# Patient Record
Sex: MALE | Race: BLACK OR AFRICAN AMERICAN | URBAN - METROPOLITAN AREA
[De-identification: names, ages, dates, MRNs, and addresses within clinical notes are randomized per-mention and may not be internally consistent; named-entity substitution may affect disease eponyms.]

---

## 2020-09-14 ENCOUNTER — LAB OUTSIDE AN ENCOUNTER (OUTPATIENT)
Dept: URBAN - METROPOLITAN AREA CLINIC 25 | Facility: CLINIC | Age: 40
End: 2020-09-14

## 2020-09-14 ENCOUNTER — OFFICE VISIT (OUTPATIENT)
Dept: URBAN - METROPOLITAN AREA CLINIC 25 | Facility: CLINIC | Age: 40
End: 2020-09-14
Payer: COMMERCIAL

## 2020-09-14 DIAGNOSIS — K92.1 RECTAL BLEEDING: ICD-10-CM

## 2020-09-14 PROCEDURE — G8417 CALC BMI ABV UP PARAM F/U: HCPCS | Performed by: INTERNAL MEDICINE

## 2020-09-14 PROCEDURE — G9903 PT SCRN TBCO ID AS NON USER: HCPCS | Performed by: INTERNAL MEDICINE

## 2020-09-14 PROCEDURE — 99204 OFFICE O/P NEW MOD 45 MIN: CPT | Performed by: INTERNAL MEDICINE

## 2020-09-14 PROCEDURE — G8427 DOCREV CUR MEDS BY ELIG CLIN: HCPCS | Performed by: INTERNAL MEDICINE

## 2020-09-14 NOTE — HPI-TODAY'S VISIT:
Sept 2020:  intermittent rectal bleeding ongoing for over 2 yrs. no prior colonoscopy. No family history of colon cancer / GI malignancies / IBD. sometimes stools are looser. no abdominal pain.

## 2020-09-29 ENCOUNTER — OFFICE VISIT (OUTPATIENT)
Dept: URBAN - METROPOLITAN AREA SURGERY CENTER 20 | Facility: SURGERY CENTER | Age: 40
End: 2020-09-29
Payer: COMMERCIAL

## 2020-09-29 DIAGNOSIS — K63.89 BACTERIAL OVERGROWTH SYNDROME: ICD-10-CM

## 2020-09-29 DIAGNOSIS — K92.1 BLACK STOOL: ICD-10-CM

## 2020-09-29 DIAGNOSIS — K52.89 (LYMPHOCYTIC) MICROSCOPIC COLITIS: ICD-10-CM

## 2020-09-29 DIAGNOSIS — K63.3 APHTHOUS ULCER OF COLON: ICD-10-CM

## 2020-09-29 PROCEDURE — 45380 COLONOSCOPY AND BIOPSY: CPT | Performed by: INTERNAL MEDICINE

## 2020-09-29 PROCEDURE — G8907 PT DOC NO EVENTS ON DISCHARG: HCPCS | Performed by: INTERNAL MEDICINE

## 2020-09-29 PROCEDURE — G9937 DIG OR SURV COLSCO: HCPCS | Performed by: INTERNAL MEDICINE

## 2020-10-02 ENCOUNTER — TELEPHONE ENCOUNTER (OUTPATIENT)
Dept: URBAN - METROPOLITAN AREA CLINIC 92 | Facility: CLINIC | Age: 40
End: 2020-10-02

## 2020-10-02 ENCOUNTER — LAB OUTSIDE AN ENCOUNTER (OUTPATIENT)
Dept: URBAN - METROPOLITAN AREA CLINIC 92 | Facility: CLINIC | Age: 40
End: 2020-10-02

## 2020-10-22 ENCOUNTER — LAB OUTSIDE AN ENCOUNTER (OUTPATIENT)
Dept: URBAN - METROPOLITAN AREA CLINIC 25 | Facility: CLINIC | Age: 40
End: 2020-10-22

## 2020-10-28 LAB
BASO (ABSOLUTE): 0.1
BASOS: 1
C DIFFICILE TOXIN GENE NAA: NEGATIVE
C-REACTIVE PROTEIN, QUANT: 4
CALPROTECTIN, FECAL: 179
EOS (ABSOLUTE): 0.4
EOS: 7
HBSAG SCREEN: NEGATIVE
HEMATOCRIT: 36.7
HEMATOLOGY COMMENTS:: (no result)
HEMOGLOBIN: 11.8
HEP B SURFACE AB, QUAL: NON REACTIVE
IMMATURE CELLS: (no result)
IMMATURE GRANS (ABS): 0
IMMATURE GRANULOCYTES: 0
LYMPHS (ABSOLUTE): 1.4
LYMPHS: 26
MCH: 25.1
MCHC: 32.2
MCV: 78
MONOCYTES(ABSOLUTE): 0.5
MONOCYTES: 9
NEUTROPHILS (ABSOLUTE): 3.1
NEUTROPHILS: 57
NRBC: (no result)
PLATELETS: 385
QUANTIFERON CRITERIA: (no result)
QUANTIFERON INCUBATION: (no result)
QUANTIFERON MITOGEN VALUE: (no result)
QUANTIFERON NIL VALUE: (no result)
QUANTIFERON TB1 AG VALUE: (no result)
QUANTIFERON TB2 AG VALUE: (no result)
QUANTIFERON-TB GOLD PLUS: (no result)
RBC: 4.71
RDW: 14.5
REQUEST PROBLEM: (no result)
WBC: 5.4

## 2020-12-03 ENCOUNTER — OFFICE VISIT (OUTPATIENT)
Dept: URBAN - METROPOLITAN AREA CLINIC 84 | Facility: CLINIC | Age: 40
End: 2020-12-03
Payer: COMMERCIAL

## 2020-12-03 ENCOUNTER — WEB ENCOUNTER (OUTPATIENT)
Dept: URBAN - METROPOLITAN AREA CLINIC 84 | Facility: CLINIC | Age: 40
End: 2020-12-03

## 2020-12-03 ENCOUNTER — LAB OUTSIDE AN ENCOUNTER (OUTPATIENT)
Dept: URBAN - METROPOLITAN AREA CLINIC 84 | Facility: CLINIC | Age: 40
End: 2020-12-03

## 2020-12-03 ENCOUNTER — OFFICE VISIT (OUTPATIENT)
Dept: URBAN - METROPOLITAN AREA CLINIC 84 | Facility: CLINIC | Age: 40
End: 2020-12-03

## 2020-12-03 ENCOUNTER — TELEPHONE ENCOUNTER (OUTPATIENT)
Dept: URBAN - METROPOLITAN AREA CLINIC 92 | Facility: CLINIC | Age: 40
End: 2020-12-03

## 2020-12-03 DIAGNOSIS — K52.9 COLITIS: ICD-10-CM

## 2020-12-03 DIAGNOSIS — K42.9 UMBILICAL HERNIA: ICD-10-CM

## 2020-12-03 DIAGNOSIS — K92.1 RECTAL BLEEDING: ICD-10-CM

## 2020-12-03 PROCEDURE — G9903 PT SCRN TBCO ID AS NON USER: HCPCS | Performed by: INTERNAL MEDICINE

## 2020-12-03 PROCEDURE — G8417 CALC BMI ABV UP PARAM F/U: HCPCS | Performed by: INTERNAL MEDICINE

## 2020-12-03 PROCEDURE — G8427 DOCREV CUR MEDS BY ELIG CLIN: HCPCS | Performed by: INTERNAL MEDICINE

## 2020-12-03 PROCEDURE — G8483 FLU IMM NO ADMIN DOC REA: HCPCS | Performed by: INTERNAL MEDICINE

## 2020-12-03 PROCEDURE — 99214 OFFICE O/P EST MOD 30 MIN: CPT | Performed by: INTERNAL MEDICINE

## 2020-12-03 NOTE — HPI-TODAY'S VISIT:
December 2020- no frequent diarrhea. formed stools. no rectal bleeding. no abdominal pain. has not seen anyone to discuss sleep apnea.   Labs from October 2020 reviewed.  Hepatitis B not immune, normal C-reactive protein, elevated fecal calprotectin to 179, hemoglobin 11.8, MCV 78, C. difficile negative, hepatitis B surface antigen negative.  TB test unable to be performed because of insufficient sample. CMP not done.   CT enterography from October 2020 revealed fatty liver, no gallstones or biliary ductal dilation, no abdominal ascites, a few mildly prominent nonenlarged mesenteric lymph nodes measuring 9 mm in size, nonspecific, Visualized bowel did not reveal any inflammation in the small or large bowel, questionable mild rectal wall thickening, no abscess or fistula, a small fat-containing umbilical hernia is present measuring 2.1 cm in size. Colonoscopy performed in September 2020, was technically challenging because of patient's body habitus, suboptimal bowel prep, and oxygen desaturation.  Deep TI intubation was not possible but ileocecal valve appeared normal.  2, 3 to 5 mm polypoid ulcerated lesions were present in the cecum which were biopsied-path revealed active colitis with ulceration.  12 mm polypoid ascending colon lesion also biopsied-Pat revealed mild active colitis, 5 mm polypoid ascending colon lesion also biopsied-path revealed mild active colitis, TI was biopsied from a distance and revealed moderate active inflammation.  Mild active colitis was also present in the right colon and left colon biopsy.  Rectum biopsies were benign.  There was multiple patchy erosions in the sigmoid and descending colon, visualized transverse colon and ascending colon appeared normal, suboptimal bowel prep.

## 2020-12-03 NOTE — HPI-TODAY'S VISIT:
intermittent rectal bleeding ongoing for over 2 yrs. no prior colonoscopy. No family history of colon cancer / GI malignancies / IBD. sometimes stools are looser. no abdominal pain. Labs from October 2020 revealed normal C-reactive protein, elevated fecal calprotectin of 179, mild anemia with a hemoglobin of 11.8, stool C. difficile negative, hepatitis B negative, not immune, TB test was not performed, CMP was not performed.  CT abdomen pelvis enterography with contrast revealed fatty liver, small umbilical hernia, mildly enlarged mesenteric lymph nodes likely reactive, no abnormal inflammation in the small bowel or colon. Colonoscopy in September 2020 revealed 2 polypoid ulcerated lesions in the cecum, polypoid lesion in the ascending colon measuring 12 mm and another measuring 5 mm, mild patchy erosions in the descending and sigmoid colon, normal-appearing transverse and ascending colon, TI intubation not successful, technically challenging colonoscopy because of patient's morbid obesity and sleep apnea with frequent oxygen desaturation.  Patient was advised to have a repeat colonoscopy within 3 months and also check in with his PCP to further manage sleep apnea.  Had from TI biopsy revealed active inflammation, polypoid lesions in the cecum and ascending colon were also active colitis with ulceration rectal biopsies did not reveal any inflammation but there was mild to moderate active colitis present in the right colon, transverse colon as well as left colon.

## 2021-01-14 ENCOUNTER — OFFICE VISIT (OUTPATIENT)
Dept: URBAN - METROPOLITAN AREA SURGERY CENTER 20 | Facility: SURGERY CENTER | Age: 41
End: 2021-01-14
Payer: COMMERCIAL

## 2021-01-14 DIAGNOSIS — K63.5 BENIGN COLON POLYP: ICD-10-CM

## 2021-01-14 DIAGNOSIS — K63.3 APHTHOUS ULCER OF COLON: ICD-10-CM

## 2021-01-14 DIAGNOSIS — K63.89 BACTERIAL OVERGROWTH SYNDROME: ICD-10-CM

## 2021-01-14 DIAGNOSIS — K92.1 BLACK STOOL: ICD-10-CM

## 2021-01-14 DIAGNOSIS — K52.89 (LYMPHOCYTIC) MICROSCOPIC COLITIS: ICD-10-CM

## 2021-01-14 PROCEDURE — 45385 COLONOSCOPY W/LESION REMOVAL: CPT | Performed by: INTERNAL MEDICINE

## 2021-01-14 PROCEDURE — 45380 COLONOSCOPY AND BIOPSY: CPT | Performed by: INTERNAL MEDICINE

## 2021-01-14 PROCEDURE — G9937 DIG OR SURV COLSCO: HCPCS | Performed by: INTERNAL MEDICINE

## 2021-01-14 PROCEDURE — G8907 PT DOC NO EVENTS ON DISCHARG: HCPCS | Performed by: INTERNAL MEDICINE

## 2021-01-26 ENCOUNTER — TELEPHONE ENCOUNTER (OUTPATIENT)
Dept: URBAN - METROPOLITAN AREA CLINIC 92 | Facility: CLINIC | Age: 41
End: 2021-01-26

## 2021-01-26 PROBLEM — 64766004 ULCERATIVE COLITIS: Status: ACTIVE | Noted: 2021-01-26

## 2021-01-26 RX ORDER — MESALAMINE 0.38 G/1
4 CAPSULES IN THE MORNING CAPSULE, EXTENDED RELEASE ORAL ONCE A DAY
Qty: 360 CAPSULE | Refills: 3 | OUTPATIENT

## 2021-03-19 ENCOUNTER — OFFICE VISIT (OUTPATIENT)
Dept: URBAN - METROPOLITAN AREA CLINIC 25 | Facility: CLINIC | Age: 41
End: 2021-03-19

## 2021-04-21 ENCOUNTER — OFFICE VISIT (OUTPATIENT)
Dept: URBAN - METROPOLITAN AREA CLINIC 25 | Facility: CLINIC | Age: 41
End: 2021-04-21
Payer: COMMERCIAL

## 2021-04-21 DIAGNOSIS — K52.9 INFLAMMATORY BOWEL DISEASES (IBD): ICD-10-CM

## 2021-04-21 DIAGNOSIS — K92.1 RECTAL BLEEDING: ICD-10-CM

## 2021-04-21 DIAGNOSIS — K42.9 UMBILICAL HERNIA: ICD-10-CM

## 2021-04-21 PROCEDURE — 99214 OFFICE O/P EST MOD 30 MIN: CPT | Performed by: INTERNAL MEDICINE

## 2021-04-21 RX ORDER — MESALAMINE 0.38 G/1
4 CAPSULES IN THE MORNING CAPSULE, EXTENDED RELEASE ORAL ONCE A DAY
Qty: 360 CAPSULE | Refills: 3 | Status: ACTIVE | COMMUNITY

## 2021-04-21 RX ORDER — MESALAMINE 0.38 G/1
4 CAPSULES IN THE MORNING CAPSULE, EXTENDED RELEASE ORAL ONCE A DAY
Qty: 90 | Refills: 3 | OUTPATIENT
Start: 2021-04-21 | End: 2022-04-16

## 2021-04-21 NOTE — PHYSICAL EXAM EYES:
Catheter used: Left 4. Catheter removed. Conjuntivae and eyelids appear normal, Sclerae : White without injection

## 2021-04-21 NOTE — HPI-OTHER HISTORIES
Dec 2020:  intermittent rectal bleeding ongoing for over 2 yrs. no prior colonoscopy. No family history of colon cancer / GI malignancies / IBD. sometimes stools are looser. no abdominal pain. Labs from October 2020 revealed normal C-reactive protein, elevated fecal calprotectin of 179, mild anemia with a hemoglobin of 11.8, stool C. difficile negative, hepatitis B negative, not immune, TB test was not performed, CMP was not performed.  CT abdomen pelvis enterography with contrast revealed fatty liver, small umbilical hernia, mildly enlarged mesenteric lymph nodes likely reactive, no abnormal inflammation in the small bowel or colon. Colonoscopy in September 2020 revealed 2 polypoid ulcerated lesions in the cecum, polypoid lesion in the ascending colon measuring 12 mm and another measuring 5 mm, mild patchy erosions in the descending and sigmoid colon, normal-appearing transverse and ascending colon, TI intubation not successful, technically challenging colonoscopy because of patient's morbid obesity and sleep apnea with frequent oxygen desaturation.  Patient was advised to have a repeat colonoscopy within 3 months and also check in with his PCP to further manage sleep apnea.  Had from TI biopsy revealed active inflammation, polypoid lesions in the cecum and ascending colon were also active colitis with ulceration rectal biopsies did not reveal any inflammation but there was mild to moderate active colitis present in the right colon, transverse colon as well as left colon.

## 2021-04-21 NOTE — HPI-TODAY'S VISIT:
April 2021 visit  1 BM formed stools. No rectal bleeding. No abdominal pain. On mesalamine daily.   Patient underwent a repeat colonoscopy in Jan 2021 which revealed a 4 mm cecal polyp path revealed granulation tissue, normal TI path did not reveal any inflammation, 8 mm proximal ascending colon polyp path revealed inflamed hyperplastic polyp and granulation tissue, 10 mm mid ascending colon polyp path revealed ulceration and granulation tissue, diffuse mild mucosal changes characterized by erosions and granularity was seen in the entire colon, medium sized lipoma seen in the transverse colon, path confirmed lipoma.  Path from the entire colon revealed focal active colitis Labs from October 2020 revealed hepatitis B surface antigen negative, hepatitis B not immune, normal C-reactive protein at 4, slightly elevated fecal calprotectin of 179, TB test not performed, hemoglobin low 11.8, normal WBC, normal platelet count, no C. difficile. CT enterography October 2020 revealed normal-appearing small bowel, mild questionable rectal wall thickening, fatty liver.

## 2021-04-28 ENCOUNTER — LAB OUTSIDE AN ENCOUNTER (OUTPATIENT)
Dept: URBAN - METROPOLITAN AREA CLINIC 92 | Facility: CLINIC | Age: 41
End: 2021-04-28

## 2021-04-28 ENCOUNTER — TELEPHONE ENCOUNTER (OUTPATIENT)
Dept: URBAN - METROPOLITAN AREA CLINIC 92 | Facility: CLINIC | Age: 41
End: 2021-04-28

## 2021-04-28 LAB
A/G RATIO: 1.6
ALBUMIN: 4.5
ALKALINE PHOSPHATASE: 129
ALT (SGPT): 39
AST (SGOT): 46
BILIRUBIN, TOTAL: 0.8
BUN/CREATININE RATIO: 9
BUN: 13
C-REACTIVE PROTEIN, QUANT: 2
CALCIUM: 9.9
CARBON DIOXIDE, TOTAL: 25
CHLORIDE: 102
CREATININE: 1.38
EGFR IF AFRICN AM: 72
EGFR IF NONAFRICN AM: 63
GLOBULIN, TOTAL: 2.9
GLUCOSE: 122
HEMATOCRIT: 41.1
HEMOGLOBIN: 12.8
MCH: 24.8
MCHC: 31.1
MCV: 80
NRBC: (no result)
PLATELETS: 365
POTASSIUM: 4.1
PROTEIN, TOTAL: 7.4
RBC: 5.16
RDW: 14.4
SODIUM: 140
WBC: 6.2

## 2021-04-30 ENCOUNTER — TELEPHONE ENCOUNTER (OUTPATIENT)
Dept: URBAN - METROPOLITAN AREA CLINIC 92 | Facility: CLINIC | Age: 41
End: 2021-04-30

## 2021-04-30 LAB — CALPROTECTIN, FECAL: 196

## 2021-08-20 ENCOUNTER — OFFICE VISIT (OUTPATIENT)
Dept: URBAN - METROPOLITAN AREA CLINIC 25 | Facility: CLINIC | Age: 41
End: 2021-08-20

## 2021-08-27 ENCOUNTER — OFFICE VISIT (OUTPATIENT)
Dept: URBAN - METROPOLITAN AREA CLINIC 25 | Facility: CLINIC | Age: 41
End: 2021-08-27

## 2021-08-27 RX ORDER — MESALAMINE 0.38 G/1
4 CAPSULES IN THE MORNING CAPSULE, EXTENDED RELEASE ORAL ONCE A DAY
Qty: 90 | Refills: 3 | OUTPATIENT

## 2021-08-27 RX ORDER — MESALAMINE 0.38 G/1
4 CAPSULES IN THE MORNING CAPSULE, EXTENDED RELEASE ORAL ONCE A DAY
Qty: 90 | Refills: 3 | Status: ACTIVE | COMMUNITY
Start: 2021-04-21 | End: 2022-04-16

## 2021-08-27 RX ORDER — MESALAMINE 0.38 G/1
4 CAPSULES IN THE MORNING CAPSULE, EXTENDED RELEASE ORAL ONCE A DAY
Qty: 360 CAPSULE | Refills: 3 | Status: ACTIVE | COMMUNITY

## 2021-08-27 NOTE — HPI-OTHER HISTORIES
April 2021 visit  1 BM formed stools. No rectal bleeding. No abdominal pain. On mesalamine daily.   Patient underwent a repeat colonoscopy in Jan 2021 which revealed a 4 mm cecal polyp path revealed granulation tissue, normal TI path did not reveal any inflammation, 8 mm proximal ascending colon polyp path revealed inflamed hyperplastic polyp and granulation tissue, 10 mm mid ascending colon polyp path revealed ulceration and granulation tissue, diffuse mild mucosal changes characterized by erosions and granularity was seen in the entire colon, medium sized lipoma seen in the transverse colon, path confirmed lipoma.  Path from the entire colon revealed focal active colitis Labs from October 2020 revealed hepatitis B surface antigen negative, hepatitis B not immune, normal C-reactive protein at 4, slightly elevated fecal calprotectin of 179, TB test not performed, hemoglobin low 11.8, normal WBC, normal platelet count, no C. difficile. CT enterography October 2020 revealed normal-appearing small bowel, mild questionable rectal wall thickening, fatty liver.  Dec 2020:  intermittent rectal bleeding ongoing for over 2 yrs. no prior colonoscopy. No family history of colon cancer / GI malignancies / IBD. sometimes stools are looser. no abdominal pain. Labs from October 2020 revealed normal C-reactive protein, elevated fecal calprotectin of 179, mild anemia with a hemoglobin of 11.8, stool C. difficile negative, hepatitis B negative, not immune, TB test was not performed, CMP was not performed.  CT abdomen pelvis enterography with contrast revealed fatty liver, small umbilical hernia, mildly enlarged mesenteric lymph nodes likely reactive, no abnormal inflammation in the small bowel or colon. Colonoscopy in September 2020 revealed 2 polypoid ulcerated lesions in the cecum, polypoid lesion in the ascending colon measuring 12 mm and another measuring 5 mm, mild patchy erosions in the descending and sigmoid colon, normal-appearing transverse and ascending colon, TI intubation not successful, technically challenging colonoscopy because of patient's morbid obesity and sleep apnea with frequent oxygen desaturation.  Patient was advised to have a repeat colonoscopy within 3 months and also check in with his PCP to further manage sleep apnea.  Had from TI biopsy revealed active inflammation, polypoid lesions in the cecum and ascending colon were also active colitis with ulceration rectal biopsies did not reveal any inflammation but there was mild to moderate active colitis present in the right colon, transverse colon as well as left colon.

## 2021-09-28 ENCOUNTER — TELEPHONE ENCOUNTER (OUTPATIENT)
Dept: URBAN - METROPOLITAN AREA CLINIC 92 | Facility: CLINIC | Age: 41
End: 2021-09-28

## 2021-10-11 ENCOUNTER — LAB OUTSIDE AN ENCOUNTER (OUTPATIENT)
Dept: URBAN - METROPOLITAN AREA CLINIC 25 | Facility: CLINIC | Age: 41
End: 2021-10-11

## 2021-10-13 LAB
ATYPICAL PANCA: (no result)
C-REACTIVE PROTEIN, QUANT: 2
CALPROTECTIN, FECAL: 322
SACCHAROMYCES CEREVISIAE, IGA: <20
SACCHAROMYCES CEREVISIAE, IGG: <20

## 2021-10-25 ENCOUNTER — OFFICE VISIT (OUTPATIENT)
Dept: URBAN - METROPOLITAN AREA TELEHEALTH 2 | Facility: TELEHEALTH | Age: 41
End: 2021-10-25
Payer: COMMERCIAL

## 2021-10-25 ENCOUNTER — LAB OUTSIDE AN ENCOUNTER (OUTPATIENT)
Dept: URBAN - METROPOLITAN AREA TELEHEALTH 2 | Facility: TELEHEALTH | Age: 41
End: 2021-10-25

## 2021-10-25 ENCOUNTER — TELEPHONE ENCOUNTER (OUTPATIENT)
Dept: URBAN - METROPOLITAN AREA CLINIC 92 | Facility: CLINIC | Age: 41
End: 2021-10-25

## 2021-10-25 DIAGNOSIS — R19.7 ACUTE DIARRHEA: ICD-10-CM

## 2021-10-25 DIAGNOSIS — K42.9 UMBILICAL HERNIA: ICD-10-CM

## 2021-10-25 DIAGNOSIS — K76.0 FATTY LIVER: ICD-10-CM

## 2021-10-25 PROCEDURE — 99214 OFFICE O/P EST MOD 30 MIN: CPT | Performed by: INTERNAL MEDICINE

## 2021-10-25 RX ORDER — BUDESONIDE 9 MG/1
1 TABLET IN THE MORNING TABLET, EXTENDED RELEASE ORAL ONCE A DAY
Qty: 60 TABLET | Refills: 0 | OUTPATIENT
Start: 2021-10-25 | End: 2021-12-23

## 2021-10-25 RX ORDER — MESALAMINE 0.38 G/1
4 CAPSULES IN THE MORNING CAPSULE, EXTENDED RELEASE ORAL ONCE A DAY
Qty: 360 CAPSULE | Refills: 3 | Status: ACTIVE | COMMUNITY

## 2021-10-25 RX ORDER — MESALAMINE 0.38 G/1
4 CAPSULES IN THE MORNING CAPSULE, EXTENDED RELEASE ORAL ONCE A DAY
Qty: 90 | Refills: 3 | Status: DISCONTINUED | COMMUNITY

## 2021-10-25 RX ORDER — MESALAMINE 0.38 G/1
4 CAPSULES IN THE MORNING CAPSULE, EXTENDED RELEASE ORAL ONCE A DAY
Qty: 90 | Refills: 3 | OUTPATIENT

## 2021-10-25 NOTE — HPI-TODAY'S VISIT:
October 2021 visit:  On mesalamine. stool consistency varies. sometimes has loose stools. trace rectal bleeding. very infrequent. no abdominal pain. 1 BM per day.  Fecal calprotectin level elevated at 322.  IBD panel negative.  Normal C-reactive protein level. complains of pain/discomfort

## 2022-02-14 ENCOUNTER — ERX REFILL RESPONSE (OUTPATIENT)
Dept: URBAN - METROPOLITAN AREA CLINIC 25 | Facility: CLINIC | Age: 42
End: 2022-02-14

## 2022-02-14 RX ORDER — MESALAMINE 375 MG/1
TAKE 4 CAPSULES BY MOUTH EVERY MORNING CAPSULE, EXTENDED RELEASE ORAL
Qty: 360 CAPSULE | Refills: 4 | OUTPATIENT

## 2022-02-14 RX ORDER — MESALAMINE 0.38 G/1
4 CAPSULES IN THE MORNING CAPSULE, EXTENDED RELEASE ORAL ONCE A DAY
Qty: 360 CAPSULE | Refills: 3 | OUTPATIENT

## 2022-04-26 ENCOUNTER — OFFICE VISIT (OUTPATIENT)
Dept: URBAN - METROPOLITAN AREA SURGERY CENTER 20 | Facility: SURGERY CENTER | Age: 42
End: 2022-04-26
Payer: COMMERCIAL

## 2022-04-26 DIAGNOSIS — K52.89 OTHER SPECIFIED NONINFECTIVE GASTROENTERITIS AND COLITIS: ICD-10-CM

## 2022-04-26 PROCEDURE — 45380 COLONOSCOPY AND BIOPSY: CPT | Performed by: INTERNAL MEDICINE

## 2022-04-26 PROCEDURE — G8907 PT DOC NO EVENTS ON DISCHARG: HCPCS | Performed by: INTERNAL MEDICINE

## 2022-04-26 RX ORDER — MESALAMINE 375 MG/1
TAKE 4 CAPSULES BY MOUTH EVERY MORNING CAPSULE, EXTENDED RELEASE ORAL
Qty: 360 CAPSULE | Refills: 4 | Status: ACTIVE | COMMUNITY

## 2022-05-17 ENCOUNTER — TELEPHONE ENCOUNTER (OUTPATIENT)
Dept: URBAN - METROPOLITAN AREA CLINIC 92 | Facility: CLINIC | Age: 42
End: 2022-05-17

## 2022-07-29 ENCOUNTER — OFFICE VISIT (OUTPATIENT)
Dept: URBAN - METROPOLITAN AREA CLINIC 25 | Facility: CLINIC | Age: 42
End: 2022-07-29
Payer: COMMERCIAL

## 2022-07-29 ENCOUNTER — TELEPHONE ENCOUNTER (OUTPATIENT)
Dept: URBAN - METROPOLITAN AREA CLINIC 92 | Facility: CLINIC | Age: 42
End: 2022-07-29

## 2022-07-29 VITALS — BODY MASS INDEX: 34.1 KG/M2 | WEIGHT: 280 LBS | HEIGHT: 76 IN

## 2022-07-29 DIAGNOSIS — K76.0 FATTY LIVER: ICD-10-CM

## 2022-07-29 DIAGNOSIS — K50.00 TERMINAL ILEITIS WITHOUT COMPLICATION: ICD-10-CM

## 2022-07-29 DIAGNOSIS — R19.7 DIARRHEA: ICD-10-CM

## 2022-07-29 DIAGNOSIS — K42.9 UMBILICAL HERNIA: ICD-10-CM

## 2022-07-29 DIAGNOSIS — K52.9 INFLAMMATORY BOWEL DISEASES (IBD): ICD-10-CM

## 2022-07-29 DIAGNOSIS — K52.9 COLITIS: ICD-10-CM

## 2022-07-29 PROBLEM — 64226004 COLITIS: Status: ACTIVE | Noted: 2022-04-13

## 2022-07-29 PROBLEM — 235709008: Status: ACTIVE | Noted: 2022-07-29

## 2022-07-29 PROBLEM — 197321007 FATTY LIVER: Status: ACTIVE | Noted: 2021-10-25

## 2022-07-29 PROCEDURE — 99213 OFFICE O/P EST LOW 20 MIN: CPT | Performed by: INTERNAL MEDICINE

## 2022-07-29 RX ORDER — MESALAMINE 375 MG/1
TAKE 4 CAPSULES BY MOUTH EVERY MORNING CAPSULE, EXTENDED RELEASE ORAL
Qty: 360 CAPSULE | Refills: 4 | Status: ACTIVE | COMMUNITY

## 2022-07-29 RX ORDER — MESALAMINE 0.38 G/1
4 CAPSULES IN THE MORNING CAPSULE, EXTENDED RELEASE ORAL ONCE A DAY
Qty: 90 | Refills: 3 | OUTPATIENT

## 2022-07-29 NOTE — HPI-OTHER HISTORIES
October 2021 visit:  On mesalamine. stool consistency varies. sometimes has loose stools. trace rectal bleeding. very infrequent. no abdominal pain. 1 BM per day.  Fecal calprotectin level elevated at 322.  IBD panel negative.  Normal C-reactive protein level.  April 2021 visit  1 BM formed stools. No rectal bleeding. No abdominal pain. On mesalamine daily.   Patient underwent a repeat colonoscopy in Jan 2021 which revealed a 4 mm cecal polyp path revealed granulation tissue, normal TI path did not reveal any inflammation, 8 mm proximal ascending colon polyp path revealed inflamed hyperplastic polyp and granulation tissue, 10 mm mid ascending colon polyp path revealed ulceration and granulation tissue, diffuse mild mucosal changes characterized by erosions and granularity was seen in the entire colon, medium sized lipoma seen in the transverse colon, path confirmed lipoma.  Path from the entire colon revealed focal active colitis Labs from October 2020 revealed hepatitis B surface antigen negative, hepatitis B not immune, normal C-reactive protein at 4, slightly elevated fecal calprotectin of 179, TB test not performed, hemoglobin low 11.8, normal WBC, normal platelet count, no C. difficile. CT enterography October 2020 revealed normal-appearing small bowel, mild questionable rectal wall thickening, fatty liver.  Dec 2020:  intermittent rectal bleeding ongoing for over 2 yrs. no prior colonoscopy. No family history of colon cancer / GI malignancies / IBD. sometimes stools are looser. no abdominal pain. Labs from October 2020 revealed normal C-reactive protein, elevated fecal calprotectin of 179, mild anemia with a hemoglobin of 11.8, stool C. difficile negative, hepatitis B negative, not immune, TB test was not performed, CMP was not performed.  CT abdomen pelvis enterography with contrast revealed fatty liver, small umbilical hernia, mildly enlarged mesenteric lymph nodes likely reactive, no abnormal inflammation in the small bowel or colon. Colonoscopy in September 2020 revealed 2 polypoid ulcerated lesions in the cecum, polypoid lesion in the ascending colon measuring 12 mm and another measuring 5 mm, mild patchy erosions in the descending and sigmoid colon, normal-appearing transverse and ascending colon, TI intubation not successful, technically challenging colonoscopy because of patient's morbid obesity and sleep apnea with frequent oxygen desaturation.  Patient was advised to have a repeat colonoscopy within 3 months and also check in with his PCP to further manage sleep apnea.  Had from TI biopsy revealed active inflammation, polypoid lesions in the cecum and ascending colon were also active colitis with ulceration rectal biopsies did not reveal any inflammation but there was mild to moderate active colitis present in the right colon, transverse colon as well as left colon.

## 2022-07-29 NOTE — HPI-TODAY'S VISIT:
July 22 visit: telemed.  Colonoscopy in April 22 revealed somewhat suboptimal prep and scattered segments of the colon, no endoscopic colitis, normal TI, path from the TI confirmed focal active ileitis but biopsies from the colon did not reveal any endoscopic colitis.  Taking 4 mesalamine. past week stools are slightly looser. prior to that had normal bowel consistency. scant rectal bleeding last week. no abdominal pain. bowel freq is 2-3 per day this past week.

## 2022-11-16 ENCOUNTER — ERX REFILL RESPONSE (OUTPATIENT)
Dept: URBAN - METROPOLITAN AREA CLINIC 25 | Facility: CLINIC | Age: 42
End: 2022-11-16

## 2022-11-16 RX ORDER — MESALAMINE 0.38 G/1
4 CAPSULES IN THE MORNING CAPSULE, EXTENDED RELEASE ORAL ONCE A DAY
Qty: 90 | Refills: 3 | OUTPATIENT

## 2022-11-16 RX ORDER — MESALAMINE 0.38 G/1
TAKE 4 CAPSULES BY MOUTH IN THE MORNING CAPSULE, EXTENDED RELEASE ORAL
Qty: 360 CAPSULE | Refills: 5 | OUTPATIENT

## 2022-11-22 ENCOUNTER — TELEPHONE ENCOUNTER (OUTPATIENT)
Dept: URBAN - METROPOLITAN AREA CLINIC 25 | Facility: CLINIC | Age: 42
End: 2022-11-22

## 2023-11-18 ENCOUNTER — OUT OF OFFICE VISIT (OUTPATIENT)
Dept: URBAN - METROPOLITAN AREA SURGERY CENTER 20 | Facility: SURGERY CENTER | Age: 43
End: 2023-11-18

## 2023-12-07 ENCOUNTER — ERX REFILL RESPONSE (OUTPATIENT)
Dept: URBAN - METROPOLITAN AREA CLINIC 25 | Facility: CLINIC | Age: 43
End: 2023-12-07

## 2023-12-07 ENCOUNTER — TELEPHONE ENCOUNTER (OUTPATIENT)
Dept: URBAN - METROPOLITAN AREA CLINIC 25 | Facility: CLINIC | Age: 43
End: 2023-12-07

## 2023-12-07 RX ORDER — MESALAMINE 0.38 G/1
4 CAPSULES IN THE MORNING CAPSULE, EXTENDED RELEASE ORAL ONCE A DAY
Qty: 360 CAPSULE | Refills: 0 | OUTPATIENT

## 2023-12-07 RX ORDER — MESALAMINE 0.38 G/1
TAKE 4 CAPSULES BY MOUTH IN THE MORNING CAPSULE, EXTENDED RELEASE ORAL
Qty: 360 CAPSULE | Refills: 5 | OUTPATIENT

## 2023-12-19 ENCOUNTER — LAB OUTSIDE AN ENCOUNTER (OUTPATIENT)
Dept: URBAN - METROPOLITAN AREA CLINIC 25 | Facility: CLINIC | Age: 43
End: 2023-12-19

## 2023-12-19 ENCOUNTER — CLAIMS CREATED FROM THE CLAIM WINDOW (OUTPATIENT)
Dept: URBAN - METROPOLITAN AREA CLINIC 84 | Facility: CLINIC | Age: 43
End: 2023-12-19
Payer: COMMERCIAL

## 2023-12-19 VITALS
BODY MASS INDEX: 35.34 KG/M2 | TEMPERATURE: 97.7 F | SYSTOLIC BLOOD PRESSURE: 128 MMHG | WEIGHT: 290.2 LBS | DIASTOLIC BLOOD PRESSURE: 79 MMHG | HEIGHT: 76 IN | HEART RATE: 60 BPM

## 2023-12-19 DIAGNOSIS — R19.7 DIARRHEA: ICD-10-CM

## 2023-12-19 DIAGNOSIS — K52.9 INFLAMMATORY BOWEL DISEASE: ICD-10-CM

## 2023-12-19 DIAGNOSIS — K52.9 INFLAMMATORY BOWEL DISEASES (IBD): ICD-10-CM

## 2023-12-19 DIAGNOSIS — Z12.11 COLON CANCER SCREENING: ICD-10-CM

## 2023-12-19 DIAGNOSIS — K76.0 FATTY LIVER: ICD-10-CM

## 2023-12-19 DIAGNOSIS — R93.3 ABNORMAL COLONOSCOPY: ICD-10-CM

## 2023-12-19 PROCEDURE — 99214 OFFICE O/P EST MOD 30 MIN: CPT

## 2023-12-19 RX ORDER — POLYETHYLENE GLYCOL 3350, SODIUM SULFATE ANHYDROUS, SODIUM BICARBONATE, SODIUM CHLORIDE, POTASSIUM CHLORIDE 236; 22.74; 6.74; 5.86; 2.97 G/4L; G/4L; G/4L; G/4L; G/4L
AS DIRECTED POWDER, FOR SOLUTION ORAL
Qty: 1 | Refills: 0 | OUTPATIENT
Start: 2023-12-19 | End: 2023-12-20

## 2023-12-19 RX ORDER — MESALAMINE 0.38 G/1
4 CAPSULES IN THE MORNING CAPSULE, EXTENDED RELEASE ORAL ONCE A DAY
Qty: 360 CAPSULE | Refills: 0 | Status: ACTIVE | COMMUNITY

## 2023-12-19 NOTE — HPI-OTHER HISTORIES
July 22 visit: telemed. Colonoscopy in April 22 revealed somewhat suboptimal prep and scattered segments of the colon, no endoscopic colitis, normal TI, path from the TI confirmed focal active ileitis but biopsies from the colon did not reveal any endoscopic colitis.  Taking 4 mesalamine. past week stools are slightly looser. prior to that had normal bowel consistency. scant rectal bleeding last week. no abdominal pain. bowel freq is 2-3 per day this past week.  October 2021 visit:  On mesalamine. stool consistency varies. sometimes has loose stools. trace rectal bleeding. very infrequent. no abdominal pain. 1 BM per day.  Fecal calprotectin level elevated at 322.  IBD panel negative.  Normal C-reactive protein level.  April 2021 visit  1 BM formed stools. No rectal bleeding. No abdominal pain. On mesalamine daily.   Patient underwent a repeat colonoscopy in Jan 2021 which revealed a 4 mm cecal polyp path revealed granulation tissue, normal TI path did not reveal any inflammation, 8 mm proximal ascending colon polyp path revealed inflamed hyperplastic polyp and granulation tissue, 10 mm mid ascending colon polyp path revealed ulceration and granulation tissue, diffuse mild mucosal changes characterized by erosions and granularity was seen in the entire colon, medium sized lipoma seen in the transverse colon, path confirmed lipoma.  Path from the entire colon revealed focal active colitis Labs from October 2020 revealed hepatitis B surface antigen negative, hepatitis B not immune, normal C-reactive protein at 4, slightly elevated fecal calprotectin of 179, TB test not performed, hemoglobin low 11.8, normal WBC, normal platelet count, no C. difficile. CT enterography October 2020 revealed normal-appearing small bowel, mild questionable rectal wall thickening, fatty liver.  Dec 2020:  intermittent rectal bleeding ongoing for over 2 yrs. no prior colonoscopy. No family history of colon cancer / GI malignancies / IBD. sometimes stools are looser. no abdominal pain. Labs from October 2020 revealed normal C-reactive protein, elevated fecal calprotectin of 179, mild anemia with a hemoglobin of 11.8, stool C. difficile negative, hepatitis B negative, not immune, TB test was not performed, CMP was not performed.  CT abdomen pelvis enterography with contrast revealed fatty liver, small umbilical hernia, mildly enlarged mesenteric lymph nodes likely reactive, no abnormal inflammation in the small bowel or colon. Colonoscopy in September 2020 revealed 2 polypoid ulcerated lesions in the cecum, polypoid lesion in the ascending colon measuring 12 mm and another measuring 5 mm, mild patchy erosions in the descending and sigmoid colon, normal-appearing transverse and ascending colon, TI intubation not successful, technically challenging colonoscopy because of patient's morbid obesity and sleep apnea with frequent oxygen desaturation.  Patient was advised to have a repeat colonoscopy within 3 months and also check in with his PCP to further manage sleep apnea.  Had from TI biopsy revealed active inflammation, polypoid lesions in the cecum and ascending colon were also active colitis with ulceration rectal biopsies did not reveal any inflammation but there was mild to moderate active colitis present in the right colon, transverse colon as well as left colon.

## 2023-12-19 NOTE — HPI-TODAY'S VISIT:
12/23 OV  Patient presents for f/u, C diff PCR and fecal calpro from 07/22 incomplete, patient has IBD w/diffuse patchy active colitis and inflammatory polyps, the pattern is more suggestive of Crohns disease with symptoms well managed on mesalamine. Patient was last seen in 07/22, the patient has had a partial work up completed to tentatively begin biologic tx, no TB quant. Patient has continued to take mesalamine QID, patient notes good efficacy of the mesalmine, however, he ahs noted mild triggers for diarrhea, secifically sugary caffeninated sodas. Diarrhea is intemrittent. No BRBPR/Melena. Admits to chronic arhtirtics pains. Denies vision changes. Stool consistency usually soft/mushy, no watery stools. Denies abdominal pain. Denies famhx of colon CA, father w/ polyps   - Denies cardiac hardware, dialysis, blood thinner use, and or issues with anesthesia

## 2023-12-24 LAB
ADENOVIRUS F 40/41: NOT DETECTED
CALPROTECTIN, STOOL - QDX: (no result)
CAMPYLOBACTER: NOT DETECTED
CLOSTRIDIUM DIFFICILE: NOT DETECTED
ENTAMOEBA HISTOLYTICA: NOT DETECTED
ENTEROAGGREGATIVE E.COLI: NOT DETECTED
ENTEROTOXIGENIC E.COLI: NOT DETECTED
ESCHERICHIA COLI O157: NOT DETECTED
GIARDIA LAMBLIA: NOT DETECTED
NOROVIRUS GI/GII: NOT DETECTED
ROTAVIRUS A: NOT DETECTED
SALMONELLA SPP.: NOT DETECTED
SHIGA-LIKE TOXIN PRODUCING E.COLI: NOT DETECTED
SHIGELLA SPP. / ENTEROINVASIVE E.COLI: NOT DETECTED
VIBRIO PARAHAEMOLYTICUS: NOT DETECTED
VIBRIO SPP.: NOT DETECTED
YERSINIA ENTEROCOLITICA: NOT DETECTED

## 2023-12-28 LAB
A/G RATIO: 1.5
ALBUMIN: 4.5
ALKALINE PHOSPHATASE: 98
ALT (SGPT): 35
AST (SGOT): 31
BILIRUBIN, TOTAL: 1.2
BUN/CREATININE RATIO: 10
BUN: 15
CALCIUM: 9.8
CARBON DIOXIDE, TOTAL: 25
CHLORIDE: 102
CREATININE: 1.53
EGFR: 57
GLOBULIN, TOTAL: 3
GLUCOSE: 125
HBSAG SCREEN: NEGATIVE
HEMATOCRIT: 37.7
HEMOGLOBIN: 12.7
HEP B CORE AB, TOT: NEGATIVE
HEPATITIS B SURF AB QUANT: <3.1
MCH: 25.9
MCHC: 33.7
MCV: 77
NRBC: (no result)
PLATELETS: 319
POTASSIUM: 3.7
PROTEIN, TOTAL: 7.5
QUANTIFERON CRITERIA: (no result)
QUANTIFERON INCUBATION: (no result)
QUANTIFERON MITOGEN VALUE: >10
QUANTIFERON NIL VALUE: 0.05
QUANTIFERON TB1 AG VALUE: 0.05
QUANTIFERON TB2 AG VALUE: 0.06
QUANTIFERON-TB GOLD PLUS: NEGATIVE
RBC: 4.91
RDW: 14.1
SODIUM: 142
WBC: 5.6

## 2024-01-12 ENCOUNTER — TELEPHONE ENCOUNTER (OUTPATIENT)
Dept: URBAN - METROPOLITAN AREA CLINIC 25 | Facility: CLINIC | Age: 44
End: 2024-01-12

## 2024-01-16 ENCOUNTER — OUT OF OFFICE VISIT (OUTPATIENT)
Dept: URBAN - METROPOLITAN AREA SURGERY CENTER 20 | Facility: SURGERY CENTER | Age: 44
End: 2024-01-16
Payer: COMMERCIAL

## 2024-01-16 DIAGNOSIS — Z12.11 COLON CANCER SCREENING: ICD-10-CM

## 2024-01-16 DIAGNOSIS — R93.3 ABN FINDINGS-GI TRACT: ICD-10-CM

## 2024-01-16 DIAGNOSIS — K64.8 OTHER HEMORRHOIDS: ICD-10-CM

## 2024-01-16 DIAGNOSIS — K64.4 PERIANAL SKIN TAGS: ICD-10-CM

## 2024-01-16 DIAGNOSIS — K63.89 OTHER SPECIFIED DISEASES OF INTESTINE: ICD-10-CM

## 2024-01-16 DIAGNOSIS — K58.0 IBS-D: ICD-10-CM

## 2024-01-16 PROCEDURE — G8907 PT DOC NO EVENTS ON DISCHARG: HCPCS | Performed by: INTERNAL MEDICINE

## 2024-01-16 PROCEDURE — 45380 COLONOSCOPY AND BIOPSY: CPT | Performed by: INTERNAL MEDICINE

## 2024-01-16 PROCEDURE — 00812 ANES LWR INTST SCR COLSC: CPT | Performed by: NURSE ANESTHETIST, CERTIFIED REGISTERED

## 2024-01-16 RX ORDER — MESALAMINE 0.38 G/1
4 CAPSULES IN THE MORNING CAPSULE, EXTENDED RELEASE ORAL ONCE A DAY
Qty: 360 CAPSULE | Refills: 0 | Status: ACTIVE | COMMUNITY

## 2024-01-17 ENCOUNTER — TELEPHONE ENCOUNTER (OUTPATIENT)
Dept: URBAN - METROPOLITAN AREA CLINIC 84 | Facility: CLINIC | Age: 44
End: 2024-01-17

## 2024-01-25 ENCOUNTER — TELEPHONE ENCOUNTER (OUTPATIENT)
Dept: URBAN - METROPOLITAN AREA CLINIC 25 | Facility: CLINIC | Age: 44
End: 2024-01-25

## 2024-02-27 ENCOUNTER — OV EP (OUTPATIENT)
Dept: URBAN - METROPOLITAN AREA CLINIC 84 | Facility: CLINIC | Age: 44
End: 2024-02-27

## 2024-02-27 RX ORDER — MESALAMINE 0.38 G/1
4 CAPSULES IN THE MORNING CAPSULE, EXTENDED RELEASE ORAL ONCE A DAY
Qty: 360 CAPSULE | Refills: 0 | COMMUNITY

## 2024-03-08 ENCOUNTER — OV EP (OUTPATIENT)
Dept: URBAN - METROPOLITAN AREA CLINIC 25 | Facility: CLINIC | Age: 44
End: 2024-03-08
Payer: COMMERCIAL

## 2024-03-08 VITALS
DIASTOLIC BLOOD PRESSURE: 80 MMHG | HEIGHT: 76 IN | BODY MASS INDEX: 34.22 KG/M2 | WEIGHT: 281 LBS | TEMPERATURE: 97.1 F | HEART RATE: 49 BPM | SYSTOLIC BLOOD PRESSURE: 126 MMHG

## 2024-03-08 DIAGNOSIS — D50.9 IRON DEFICIENCY ANEMIA, UNSPECIFIED IRON DEFICIENCY ANEMIA TYPE: ICD-10-CM

## 2024-03-08 DIAGNOSIS — K51.90 ULCERATIVE COLITIS: ICD-10-CM

## 2024-03-08 DIAGNOSIS — K76.0 FATTY LIVER: ICD-10-CM

## 2024-03-08 DIAGNOSIS — K50.00 TERMINAL ILEITIS WITHOUT COMPLICATION: ICD-10-CM

## 2024-03-08 PROBLEM — 87522002: Status: ACTIVE | Noted: 2024-03-08

## 2024-03-08 PROCEDURE — 99214 OFFICE O/P EST MOD 30 MIN: CPT

## 2024-03-08 RX ORDER — MESALAMINE 0.38 G/1
4 CAPSULES IN THE MORNING CAPSULE, EXTENDED RELEASE ORAL ONCE A DAY
Qty: 360 CAPSULE | Refills: 0 | COMMUNITY

## 2024-03-08 RX ORDER — MESALAMINE 0.38 G/1
4 CAPSULES IN THE MORNING CAPSULE, EXTENDED RELEASE ORAL ONCE A DAY
Qty: 360 | Refills: 3

## 2024-03-08 NOTE — HPI-OTHER HISTORIES
12/23 OV  Patient presents for f/u, C diff PCR and fecal calpro from 07/22 incomplete, patient has IBD w/diffuse patchy active colitis and inflammatory polyps, the pattern is more suggestive of Crohns disease with symptoms well managed on mesalamine. Patient was last seen in 07/22, the patient has had a partial work up completed to tentatively begin biologic tx, no TB quant. Patient has continued to take mesalamine QID, patient notes good efficacy of the mesalmine, however, he ahs noted mild triggers for diarrhea, secifically sugary caffeninated sodas. Diarrhea is intemrittent. No BRBPR/Melena. Admits to chronic arhtirtics pains. Denies vision changes. Stool consistency usually soft/mushy, no watery stools. Denies abdominal pain. Denies famhx of colon CA, father w/ polyps   - Denies cardiac hardware, dialysis, blood thinner use, and or issues with anesthesia  July 22 visit: telemed. Colonoscopy in April 22 revealed somewhat suboptimal prep and scattered segments of the colon, no endoscopic colitis, normal TI, path from the TI confirmed focal active ileitis but biopsies from the colon did not reveal any endoscopic colitis.  Taking 4 mesalamine. past week stools are slightly looser. prior to that had normal bowel consistency. scant rectal bleeding last week. no abdominal pain. bowel freq is 2-3 per day this past week.  October 2021 visit:  On mesalamine. stool consistency varies. sometimes has loose stools. trace rectal bleeding. very infrequent. no abdominal pain. 1 BM per day.  Fecal calprotectin level elevated at 322.  IBD panel negative.  Normal C-reactive protein level.  April 2021 visit  1 BM formed stools. No rectal bleeding. No abdominal pain. On mesalamine daily.   Patient underwent a repeat colonoscopy in Jan 2021 which revealed a 4 mm cecal polyp path revealed granulation tissue, normal TI path did not reveal any inflammation, 8 mm proximal ascending colon polyp path revealed inflamed hyperplastic polyp and granulation tissue, 10 mm mid ascending colon polyp path revealed ulceration and granulation tissue, diffuse mild mucosal changes characterized by erosions and granularity was seen in the entire colon, medium sized lipoma seen in the transverse colon, path confirmed lipoma.  Path from the entire colon revealed focal active colitis Labs from October 2020 revealed hepatitis B surface antigen negative, hepatitis B not immune, normal C-reactive protein at 4, slightly elevated fecal calprotectin of 179, TB test not performed, hemoglobin low 11.8, normal WBC, normal platelet count, no C. difficile. CT enterography October 2020 revealed normal-appearing small bowel, mild questionable rectal wall thickening, fatty liver.  Dec 2020:  intermittent rectal bleeding ongoing for over 2 yrs. no prior colonoscopy. No family history of colon cancer / GI malignancies / IBD. sometimes stools are looser. no abdominal pain. Labs from October 2020 revealed normal C-reactive protein, elevated fecal calprotectin of 179, mild anemia with a hemoglobin of 11.8, stool C. difficile negative, hepatitis B negative, not immune, TB test was not performed, CMP was not performed.  CT abdomen pelvis enterography with contrast revealed fatty liver, small umbilical hernia, mildly enlarged mesenteric lymph nodes likely reactive, no abnormal inflammation in the small bowel or colon. Colonoscopy in September 2020 revealed 2 polypoid ulcerated lesions in the cecum, polypoid lesion in the ascending colon measuring 12 mm and another measuring 5 mm, mild patchy erosions in the descending and sigmoid colon, normal-appearing transverse and ascending colon, TI intubation not successful, technically challenging colonoscopy because of patient's morbid obesity and sleep apnea with frequent oxygen desaturation.  Patient was advised to have a repeat colonoscopy within 3 months and also check in with his PCP to further manage sleep apnea.  Had from TI biopsy revealed active inflammation, polypoid lesions in the cecum and ascending colon were also active colitis with ulceration rectal biopsies did not reveal any inflammation but there was mild to moderate active colitis present in the right colon, transverse colon as well as left colon.

## 2024-03-20 LAB
FERRITIN, SERUM: 54
IRON BIND.CAP.(TIBC): 393
IRON SATURATION: 15
IRON: 60
UIBC: 333
VITAMIN B12: 248
VITAMIN D, 25-HYDROXY: 17.6

## 2024-03-22 PROBLEM — 34713006: Status: ACTIVE | Noted: 2024-03-22

## 2024-06-14 ENCOUNTER — OFFICE VISIT (OUTPATIENT)
Dept: URBAN - METROPOLITAN AREA CLINIC 25 | Facility: CLINIC | Age: 44
End: 2024-06-14

## 2024-07-01 ENCOUNTER — LAB OUTSIDE AN ENCOUNTER (OUTPATIENT)
Dept: URBAN - METROPOLITAN AREA CLINIC 25 | Facility: CLINIC | Age: 44
End: 2024-07-01

## 2024-07-01 ENCOUNTER — OFFICE VISIT (OUTPATIENT)
Dept: URBAN - METROPOLITAN AREA CLINIC 84 | Facility: CLINIC | Age: 44
End: 2024-07-01
Payer: COMMERCIAL

## 2024-07-01 ENCOUNTER — DASHBOARD ENCOUNTERS (OUTPATIENT)
Age: 44
End: 2024-07-01

## 2024-07-01 VITALS
BODY MASS INDEX: 35.49 KG/M2 | TEMPERATURE: 97 F | HEART RATE: 52 BPM | DIASTOLIC BLOOD PRESSURE: 75 MMHG | WEIGHT: 291.4 LBS | SYSTOLIC BLOOD PRESSURE: 129 MMHG | HEIGHT: 76 IN

## 2024-07-01 DIAGNOSIS — K51.80 CHRONIC PANCOLONIC ULCERATIVE COLITIS: ICD-10-CM

## 2024-07-01 DIAGNOSIS — K76.0 FATTY LIVER: ICD-10-CM

## 2024-07-01 DIAGNOSIS — E55.9 VITAMIN D DEFICIENCY: ICD-10-CM

## 2024-07-01 DIAGNOSIS — K52.89 (LYMPHOCYTIC) MICROSCOPIC COLITIS: ICD-10-CM

## 2024-07-01 PROCEDURE — 99213 OFFICE O/P EST LOW 20 MIN: CPT

## 2024-07-01 RX ORDER — MESALAMINE 0.38 G/1
4 CAPSULES IN THE MORNING CAPSULE, EXTENDED RELEASE ORAL ONCE A DAY
Qty: 360 | Refills: 3 | Status: ACTIVE | COMMUNITY

## 2024-07-01 NOTE — HPI-OTHER HISTORIES
03/24 OV  GPP stool study/Fecal Calpro wnl, CBC shows decreased hgb of 12.7, mcv of 77, CMP shows low GFR/Cr, otherwise labs unremarkable, quant TB gold negative, LFT's wnl. S/p colon, no inflammation seen. Diarrhe ahas been intermittent, usually influenced by food, increased caffeine usually causes diarrhea. Last episode of rectal bleeding after the colonoscopy for a day, notes blood on the stool very rarely and will occasionally seen blood when he wipes infrequently. Deneis arthralgia, vision changes, melena, abdominal pain, unintentional weigth loss. Mesalamine QID daily, patient notes good efficacy.    Colon 2024  - Non-thrombosed external hemorrhoids found on perianal exam. - The entire examined colon is normal. - The examined portion of the ileum was normal. Biopsied. - Non-bleeding internal hemorrhoids. - Several biopsies were obtained in the descending colon and in the ascending colon.  12/23 OV  Patient presents for f/u, C diff PCR and fecal calpro from 07/22 incomplete, patient has IBD w/diffuse patchy active colitis and inflammatory polyps, the pattern is more suggestive of Crohns disease with symptoms well managed on mesalamine. Patient was last seen in 07/22, the patient has had a partial work up completed to tentatively begin biologic tx, no TB quant. Patient has continued to take mesalamine QID, patient notes good efficacy of the mesalmine, however, he ahs noted mild triggers for diarrhea, secifically sugary caffeninated sodas. Diarrhea is intemrittent. No BRBPR/Melena. Admits to chronic arhtirtics pains. Denies vision changes. Stool consistency usually soft/mushy, no watery stools. Denies abdominal pain. Denies famhx of colon CA, father w/ polyps   - Denies cardiac hardware, dialysis, blood thinner use, and or issues with anesthesia  July 22 visit: telemed. Colonoscopy in April 22 revealed somewhat suboptimal prep and scattered segments of the colon, no endoscopic colitis, normal TI, path from the TI confirmed focal active ileitis but biopsies from the colon did not reveal any endoscopic colitis.  Taking 4 mesalamine. past week stools are slightly looser. prior to that had normal bowel consistency. scant rectal bleeding last week. no abdominal pain. bowel freq is 2-3 per day this past week.  October 2021 visit:  On mesalamine. stool consistency varies. sometimes has loose stools. trace rectal bleeding. very infrequent. no abdominal pain. 1 BM per day.  Fecal calprotectin level elevated at 322.  IBD panel negative.  Normal C-reactive protein level.  April 2021 visit  1 BM formed stools. No rectal bleeding. No abdominal pain. On mesalamine daily.   Patient underwent a repeat colonoscopy in Jan 2021 which revealed a 4 mm cecal polyp path revealed granulation tissue, normal TI path did not reveal any inflammation, 8 mm proximal ascending colon polyp path revealed inflamed hyperplastic polyp and granulation tissue, 10 mm mid ascending colon polyp path revealed ulceration and granulation tissue, diffuse mild mucosal changes characterized by erosions and granularity was seen in the entire colon, medium sized lipoma seen in the transverse colon, path confirmed lipoma.  Path from the entire colon revealed focal active colitis Labs from October 2020 revealed hepatitis B surface antigen negative, hepatitis B not immune, normal C-reactive protein at 4, slightly elevated fecal calprotectin of 179, TB test not performed, hemoglobin low 11.8, normal WBC, normal platelet count, no C. difficile. CT enterography October 2020 revealed normal-appearing small bowel, mild questionable rectal wall thickening, fatty liver.  Dec 2020:  intermittent rectal bleeding ongoing for over 2 yrs. no prior colonoscopy. No family history of colon cancer / GI malignancies / IBD. sometimes stools are looser. no abdominal pain. Labs from October 2020 revealed normal C-reactive protein, elevated fecal calprotectin of 179, mild anemia with a hemoglobin of 11.8, stool C. difficile negative, hepatitis B negative, not immune, TB test was not performed, CMP was not performed.  CT abdomen pelvis enterography with contrast revealed fatty liver, small umbilical hernia, mildly enlarged mesenteric lymph nodes likely reactive, no abnormal inflammation in the small bowel or colon. Colonoscopy in September 2020 revealed 2 polypoid ulcerated lesions in the cecum, polypoid lesion in the ascending colon measuring 12 mm and another measuring 5 mm, mild patchy erosions in the descending and sigmoid colon, normal-appearing transverse and ascending colon, TI intubation not successful, technically challenging colonoscopy because of patient's morbid obesity and sleep apnea with frequent oxygen desaturation.  Patient was advised to have a repeat colonoscopy within 3 months and also check in with his PCP to further manage sleep apnea.  Had from TI biopsy revealed active inflammation, polypoid lesions in the cecum and ascending colon were also active colitis with ulceration rectal biopsies did not reveal any inflammation but there was mild to moderate active colitis present in the right colon, transverse colon as well as left colon.

## 2024-07-03 LAB — CALPROTECTIN, STOOL - QDX: (no result)

## 2024-07-04 LAB
ALBUMIN: 4.4
ALKALINE PHOSPHATASE: 113
ALT (SGPT): 46
AST (SGOT): 40
BILIRUBIN, TOTAL: 1.2
BUN/CREATININE RATIO: 13
BUN: 18
CALCIUM: 10
CARBON DIOXIDE, TOTAL: 26
CHLORIDE: 103
CREATININE: 1.34
EGFR: 67
GLOBULIN, TOTAL: 3.4
GLUCOSE: 116
HEMATOCRIT: 40.7
HEMOGLOBIN: 12.8
MCH: 25.5
MCHC: 31.4
MCV: 81
NRBC: (no result)
PLATELETS: 313
POTASSIUM: 4.6
PROTEIN, TOTAL: 7.8
RBC: 5.02
RDW: 14.4
SODIUM: 141
VITAMIN D, 25-HYDROXY: 47.3
WBC: 5.6

## 2024-11-05 ENCOUNTER — OFFICE VISIT (OUTPATIENT)
Dept: URBAN - METROPOLITAN AREA CLINIC 84 | Facility: CLINIC | Age: 44
End: 2024-11-05

## 2024-11-11 ENCOUNTER — OFFICE VISIT (OUTPATIENT)
Dept: URBAN - METROPOLITAN AREA CLINIC 84 | Facility: CLINIC | Age: 44
End: 2024-11-11

## 2024-11-11 RX ORDER — MESALAMINE 0.38 G/1
4 CAPSULES IN THE MORNING CAPSULE, EXTENDED RELEASE ORAL ONCE A DAY
Qty: 360 | Refills: 3 | Status: ACTIVE | COMMUNITY

## 2025-03-14 ENCOUNTER — TELEPHONE ENCOUNTER (OUTPATIENT)
Dept: URBAN - METROPOLITAN AREA CLINIC 25 | Facility: CLINIC | Age: 45
End: 2025-03-14

## 2025-03-14 RX ORDER — MESALAMINE 0.38 G/1
4 CAPSULES IN THE MORNING CAPSULE, EXTENDED RELEASE ORAL ONCE A DAY
Qty: 360 | Refills: 3
End: 2026-03-09